# Patient Record
Sex: MALE | Race: WHITE | NOT HISPANIC OR LATINO | Employment: OTHER | URBAN - METROPOLITAN AREA
[De-identification: names, ages, dates, MRNs, and addresses within clinical notes are randomized per-mention and may not be internally consistent; named-entity substitution may affect disease eponyms.]

---

## 2023-03-13 PROBLEM — M17.9 OSTEOARTHRITIS OF KNEE: Status: ACTIVE | Noted: 2023-03-13

## 2023-03-13 PROBLEM — M25.512 LEFT SHOULDER PAIN: Status: ACTIVE | Noted: 2023-03-13

## 2023-03-13 PROBLEM — M25.561 RIGHT KNEE PAIN: Status: ACTIVE | Noted: 2023-03-13

## 2023-03-13 PROBLEM — M10.9 GOUT: Status: ACTIVE | Noted: 2023-03-13

## 2023-03-13 PROBLEM — M54.12 CERVICAL RADICULOPATHY: Status: ACTIVE | Noted: 2023-03-13

## 2023-03-13 PROBLEM — R94.31 ABNORMAL EKG: Status: ACTIVE | Noted: 2023-03-13

## 2023-03-13 PROBLEM — E66.9 CLASS 2 OBESITY WITH BODY MASS INDEX (BMI) OF 36.0 TO 36.9 IN ADULT: Status: ACTIVE | Noted: 2023-03-13

## 2023-03-13 PROBLEM — E78.5 DYSLIPIDEMIA: Status: ACTIVE | Noted: 2023-03-13

## 2023-03-13 PROBLEM — I10 ESSENTIAL HYPERTENSION: Status: ACTIVE | Noted: 2023-03-13

## 2023-03-13 PROBLEM — G47.33 OSA ON CPAP: Status: ACTIVE | Noted: 2023-03-13

## 2023-03-13 PROBLEM — E78.1 HYPERTRIGLYCERIDEMIA: Status: ACTIVE | Noted: 2023-03-13

## 2023-03-13 PROBLEM — E66.812 CLASS 2 OBESITY WITH BODY MASS INDEX (BMI) OF 36.0 TO 36.9 IN ADULT: Status: ACTIVE | Noted: 2023-03-13

## 2023-03-13 PROBLEM — K63.5 COLON POLYPS: Status: ACTIVE | Noted: 2023-03-13

## 2023-03-13 PROBLEM — E11.9 DIABETES MELLITUS, TYPE 2 (MULTI): Status: ACTIVE | Noted: 2023-03-13

## 2023-03-13 RX ORDER — ALLOPURINOL 300 MG/1
1 TABLET ORAL DAILY
COMMUNITY
Start: 2017-06-29 | End: 2023-07-14

## 2023-03-13 RX ORDER — ROSUVASTATIN CALCIUM 20 MG/1
1 TABLET, COATED ORAL NIGHTLY
COMMUNITY
Start: 2019-03-07 | End: 2023-08-24

## 2023-03-13 RX ORDER — LANCETS
EACH MISCELLANEOUS
COMMUNITY

## 2023-03-13 RX ORDER — GLIPIZIDE 2.5 MG/1
1 TABLET, EXTENDED RELEASE ORAL DAILY
COMMUNITY
Start: 2022-03-17 | End: 2023-08-24

## 2023-03-13 RX ORDER — AMLODIPINE BESYLATE 5 MG/1
1 TABLET ORAL DAILY
COMMUNITY
Start: 2022-03-17 | End: 2023-11-20

## 2023-03-13 RX ORDER — ATENOLOL AND CHLORTHALIDONE TABLET 50; 25 MG/1; MG/1
1 TABLET ORAL DAILY
COMMUNITY
Start: 2017-06-29 | End: 2023-08-24

## 2023-03-13 RX ORDER — IBUPROFEN 200 MG
CAPSULE ORAL
COMMUNITY
Start: 2021-06-24

## 2023-03-17 ENCOUNTER — OFFICE VISIT (OUTPATIENT)
Dept: PRIMARY CARE | Facility: CLINIC | Age: 73
End: 2023-03-17
Payer: MEDICARE

## 2023-03-17 DIAGNOSIS — E11.9 TYPE 2 DIABETES MELLITUS WITHOUT COMPLICATION, WITHOUT LONG-TERM CURRENT USE OF INSULIN (MULTI): ICD-10-CM

## 2023-03-17 DIAGNOSIS — L72.3 INFECTED SEBACEOUS CYST: Primary | ICD-10-CM

## 2023-03-17 DIAGNOSIS — R21 RASH: ICD-10-CM

## 2023-03-17 DIAGNOSIS — E66.01 CLASS 2 SEVERE OBESITY WITH SERIOUS COMORBIDITY AND BODY MASS INDEX (BMI) OF 37.0 TO 37.9 IN ADULT, UNSPECIFIED OBESITY TYPE (MULTI): ICD-10-CM

## 2023-03-17 DIAGNOSIS — L08.9 INFECTED SEBACEOUS CYST: Primary | ICD-10-CM

## 2023-03-17 PROCEDURE — 99213 OFFICE O/P EST LOW 20 MIN: CPT | Performed by: FAMILY MEDICINE

## 2023-03-17 PROCEDURE — 3008F BODY MASS INDEX DOCD: CPT | Performed by: FAMILY MEDICINE

## 2023-03-17 PROCEDURE — 1160F RVW MEDS BY RX/DR IN RCRD: CPT | Performed by: FAMILY MEDICINE

## 2023-03-17 PROCEDURE — 1036F TOBACCO NON-USER: CPT | Performed by: FAMILY MEDICINE

## 2023-03-17 PROCEDURE — 3078F DIAST BP <80 MM HG: CPT | Performed by: FAMILY MEDICINE

## 2023-03-17 PROCEDURE — 3075F SYST BP GE 130 - 139MM HG: CPT | Performed by: FAMILY MEDICINE

## 2023-03-17 PROCEDURE — 1159F MED LIST DOCD IN RCRD: CPT | Performed by: FAMILY MEDICINE

## 2023-03-17 ASSESSMENT — PATIENT HEALTH QUESTIONNAIRE - PHQ9
1. LITTLE INTEREST OR PLEASURE IN DOING THINGS: NOT AT ALL
SUM OF ALL RESPONSES TO PHQ9 QUESTIONS 1 AND 2: 0
2. FEELING DOWN, DEPRESSED OR HOPELESS: NOT AT ALL

## 2023-03-22 ENCOUNTER — TELEPHONE (OUTPATIENT)
Dept: PRIMARY CARE | Facility: CLINIC | Age: 73
End: 2023-03-22
Payer: MEDICARE

## 2023-03-27 NOTE — TELEPHONE ENCOUNTER
Pt called and said his boil is still there and he is wondering if you can remove the core of it and if you cant where should he go to get that removed?

## 2023-03-29 VITALS
HEART RATE: 64 BPM | BODY MASS INDEX: 37.02 KG/M2 | HEIGHT: 70 IN | DIASTOLIC BLOOD PRESSURE: 78 MMHG | SYSTOLIC BLOOD PRESSURE: 130 MMHG | WEIGHT: 258.6 LBS | TEMPERATURE: 97.7 F

## 2023-03-29 PROBLEM — L08.9 INFECTED SEBACEOUS CYST: Status: ACTIVE | Noted: 2023-03-29

## 2023-03-29 PROBLEM — M25.561 RIGHT KNEE PAIN: Status: RESOLVED | Noted: 2023-03-13 | Resolved: 2023-03-29

## 2023-03-29 PROBLEM — E78.1 HYPERTRIGLYCERIDEMIA: Status: RESOLVED | Noted: 2023-03-13 | Resolved: 2023-03-29

## 2023-03-29 PROBLEM — E66.01 CLASS 2 SEVERE OBESITY WITH SERIOUS COMORBIDITY AND BODY MASS INDEX (BMI) OF 37.0 TO 37.9 IN ADULT (MULTI): Status: ACTIVE | Noted: 2023-03-13

## 2023-03-29 PROBLEM — R21 RASH: Status: ACTIVE | Noted: 2023-03-29

## 2023-03-29 PROBLEM — L72.3 INFECTED SEBACEOUS CYST: Status: ACTIVE | Noted: 2023-03-29

## 2023-03-29 RX ORDER — DOXYCYCLINE 100 MG/1
100 CAPSULE ORAL 2 TIMES DAILY
Qty: 20 CAPSULE | Refills: 0 | Status: SHIPPED | OUTPATIENT
Start: 2023-03-29 | End: 2023-04-08

## 2023-03-29 ASSESSMENT — ENCOUNTER SYMPTOMS
NAIL CHANGES: 0
VOMITING: 0
RHINORRHEA: 0
DIARRHEA: 0
SHORTNESS OF BREATH: 0
FATIGUE: 0
ANOREXIA: 0
COUGH: 0
FEVER: 0
SORE THROAT: 0
EYE PAIN: 0

## 2023-03-29 NOTE — ASSESSMENT & PLAN NOTE
You have 2 sebaceous cysts on your back and they were infected apply the antibiotic cream take the oral antibiotic  Consult surgery to have these removed that way the cyst should not return  Please call if you need anything

## 2023-03-29 NOTE — PATIENT INSTRUCTIONS
The rash is actually to infected sebaceous cysts  The antibiotic and warm compresses should take care of it I was able to drain them today  You will need to see a surgeon to have these removed  I recommend Dr. Juan Carlos Hernandez at [facility]  Above normal BMI you need to work on diet and exercise this will help with diabetes control as well as other health issues  Blood pressure was initially high but on recheck was better  Please schedule annual Medicare wellness exam

## 2023-03-29 NOTE — PROGRESS NOTES
Subjective   Patient ID: Ashok Fang is a 72 y.o. male who presents for Rash.  Rash  This is a recurrent problem. The current episode started more than 1 month ago. The problem has been rapidly worsening since onset. The affected locations include the back. The rash is characterized by draining, pain, redness and swelling. He was exposed to nothing. Pertinent negatives include no anorexia, congestion, cough, diarrhea, eye pain, facial edema, fatigue, fever, joint pain, nail changes, rhinorrhea, shortness of breath, sore throat or vomiting. Past treatments include nothing. The treatment provided no relief. There is no history of allergies, asthma, eczema or varicella.       Review of Systems   Constitutional:  Negative for fatigue and fever.   HENT:  Negative for congestion, rhinorrhea and sore throat.    Eyes:  Negative for pain.   Respiratory:  Negative for cough and shortness of breath.    Gastrointestinal:  Negative for anorexia, diarrhea and vomiting.   Musculoskeletal:  Negative for joint pain.   Skin:  Positive for rash. Negative for nail changes.     Constitutional: no chills, no fever and no night sweats.   Eyes: no blurred vision and no eyesight problems.   ENT: no hearing loss, no nasal congestion, no nasal discharge, no hoarseness and no sore throat.   Cardiovascular: no chest pain, no intermittent leg claudication, no lower extremity edema, no palpitations and no syncope.   Respiratory: no cough, no shortness of breath during exertion, no shortness of breath at rest and no wheezing.   Gastrointestinal: no abdominal pain, no blood in stools, no constipation, no diarrhea, no melena, no nausea, no rectal pain and no vomiting.   Genitourinary: no dysuria, no change in urinary frequency, no urinary hesitancy, no feelings of urinary urgency and no vaginal discharge.   Musculoskeletal: no arthralgias,  no back pain and no myalgias.   Integumentary: no new skin lesions and no rashes.   Neurological: no  "difficulty walking, no headache, no limb weakness, no numbness and no tingling.   Psychiatric: no anxiety, no depression, no anhedonia and no substance use disorders.   Endocrine: no recent weight gain and no recent weight loss.   Hematologic/Lymphatic: no tendency for easy bruising and no swollen glands .  Objective    /77   Pulse 64   Temp 36.5 °C (97.7 °F)   Ht 1.778 m (5' 10\")   Wt 117 kg (258 lb 9.6 oz)   BMI 37.11 kg/m²    Physical Exam  The patient appeared well nourished and normally developed. Vital signs as documented. Head exam is unremarkable. No scleral icterus or corneal arcus noted.  Pupils are equal round reactive to light extraocular movements are intact no hemorrhages noted on funduscopic exam mouth mucous membranes are moist no exudates ears canals clear TMs are gray pearly not injected nose no rhinorrhea or epistaxis Neck is without jugular venous distension, thyromegaly, or carotid bruits. Carotid upstrokes are brisk bilaterally. Lungs are clear to auscultation and percussion. Cardiac exam reveals the PMI to be normally sized and situated. Rhythm is regular. First and second heart sounds normal. No murmurs, rubs or gallops. Abdominal exam reveals normal bowel sounds, no masses, no organomegaly and no aortic enlargement. Extremities are nonedematous and both femoral and pedal pulses are normal.  Neurologic exam DTRs are equal bilaterally no focal deficits strength is symmetrical heme lymph no palpable lymph nodes in the neck axilla or groin 2 large sebaceous cyst on the back  Assessment/Plan   Problem List Items Addressed This Visit          Endocrine/Metabolic    Diabetes mellitus, type 2 (CMS/HCC)     Check blood work including hemoglobin A1c  Healthy diet exercise maintain a healthy weight         Class 2 severe obesity with serious comorbidity and body mass index (BMI) of 37.0 to 37.9 in adult (CMS/HCC)     Above normal BMI nutrition and physical activity reviewed maintaining a " healthy weight and controlling BMI will help manage chronic conditions            Infectious/Inflammatory    Infected sebaceous cyst - Primary     You have 2 sebaceous cysts on your back and they were infected apply the antibiotic cream take the oral antibiotic  Consult surgery to have these removed that way the cyst should not return  Please call if you need anything            Other    Rash     Appears to be infectious in nature  Take the antibiotic  Follow-up if symptoms do not improve         Relevant Medications    doxycycline (Vibramycin) 100 mg capsule            Lore Avendano MD

## 2023-03-29 NOTE — ASSESSMENT & PLAN NOTE
Above normal BMI nutrition and physical activity reviewed maintaining a healthy weight and controlling BMI will help manage chronic conditions

## 2023-07-14 ENCOUNTER — OFFICE VISIT (OUTPATIENT)
Dept: PRIMARY CARE | Facility: CLINIC | Age: 73
End: 2023-07-14
Payer: MEDICARE

## 2023-07-14 VITALS
WEIGHT: 257 LBS | HEART RATE: 60 BPM | TEMPERATURE: 97.1 F | BODY MASS INDEX: 36.79 KG/M2 | SYSTOLIC BLOOD PRESSURE: 130 MMHG | HEIGHT: 70 IN | DIASTOLIC BLOOD PRESSURE: 77 MMHG

## 2023-07-14 DIAGNOSIS — Z00.00 MEDICARE ANNUAL WELLNESS VISIT, SUBSEQUENT: Primary | ICD-10-CM

## 2023-07-14 DIAGNOSIS — Z12.5 PROSTATE CANCER SCREENING: ICD-10-CM

## 2023-07-14 DIAGNOSIS — I10 ESSENTIAL HYPERTENSION: ICD-10-CM

## 2023-07-14 DIAGNOSIS — Z11.59 NEED FOR HEPATITIS C SCREENING TEST: ICD-10-CM

## 2023-07-14 DIAGNOSIS — E66.01 CLASS 2 SEVERE OBESITY WITH SERIOUS COMORBIDITY AND BODY MASS INDEX (BMI) OF 36.0 TO 36.9 IN ADULT, UNSPECIFIED OBESITY TYPE (MULTI): ICD-10-CM

## 2023-07-14 DIAGNOSIS — E11.9 TYPE 2 DIABETES MELLITUS WITHOUT COMPLICATION, WITHOUT LONG-TERM CURRENT USE OF INSULIN (MULTI): ICD-10-CM

## 2023-07-14 DIAGNOSIS — E78.2 MIXED HYPERLIPIDEMIA: ICD-10-CM

## 2023-07-14 DIAGNOSIS — M54.12 CERVICAL RADICULOPATHY: ICD-10-CM

## 2023-07-14 LAB
POC ALBUMIN /CREATININE RATIO MANUALLY ENTERED: <30 UG/MG CREAT
POC URINE ALBUMIN: 10 MG/L
POC URINE CREATININE: 200 MG/DL

## 2023-07-14 PROCEDURE — 3075F SYST BP GE 130 - 139MM HG: CPT | Performed by: FAMILY MEDICINE

## 2023-07-14 PROCEDURE — 1159F MED LIST DOCD IN RCRD: CPT | Performed by: FAMILY MEDICINE

## 2023-07-14 PROCEDURE — 1170F FXNL STATUS ASSESSED: CPT | Performed by: FAMILY MEDICINE

## 2023-07-14 PROCEDURE — 1036F TOBACCO NON-USER: CPT | Performed by: FAMILY MEDICINE

## 2023-07-14 PROCEDURE — 1160F RVW MEDS BY RX/DR IN RCRD: CPT | Performed by: FAMILY MEDICINE

## 2023-07-14 PROCEDURE — 82044 UR ALBUMIN SEMIQUANTITATIVE: CPT | Performed by: FAMILY MEDICINE

## 2023-07-14 PROCEDURE — 3078F DIAST BP <80 MM HG: CPT | Performed by: FAMILY MEDICINE

## 2023-07-14 PROCEDURE — 3008F BODY MASS INDEX DOCD: CPT | Performed by: FAMILY MEDICINE

## 2023-07-14 PROCEDURE — 99213 OFFICE O/P EST LOW 20 MIN: CPT | Performed by: FAMILY MEDICINE

## 2023-07-14 PROCEDURE — G0439 PPPS, SUBSEQ VISIT: HCPCS | Performed by: FAMILY MEDICINE

## 2023-07-14 PROCEDURE — 1125F AMNT PAIN NOTED PAIN PRSNT: CPT | Performed by: FAMILY MEDICINE

## 2023-07-14 RX ORDER — METHYLPREDNISOLONE 4 MG/1
TABLET ORAL
Qty: 21 TABLET | Refills: 0 | Status: SHIPPED | OUTPATIENT
Start: 2023-07-14 | End: 2023-07-21

## 2023-07-14 ASSESSMENT — ACTIVITIES OF DAILY LIVING (ADL)
MANAGING_FINANCES: INDEPENDENT
TAKING_MEDICATION: INDEPENDENT
GROCERY_SHOPPING: INDEPENDENT
DRESSING: INDEPENDENT
BATHING: INDEPENDENT
DOING_HOUSEWORK: INDEPENDENT

## 2023-07-14 ASSESSMENT — PATIENT HEALTH QUESTIONNAIRE - PHQ9
1. LITTLE INTEREST OR PLEASURE IN DOING THINGS: NOT AT ALL
2. FEELING DOWN, DEPRESSED OR HOPELESS: NOT AT ALL
SUM OF ALL RESPONSES TO PHQ9 QUESTIONS 1 AND 2: 0

## 2023-07-14 NOTE — PROGRESS NOTES
Subjective   Reason for Visit: Ashok Fang is an 72 y.o. male here for a Medicare Wellness visit.     Past Medical, Surgical, and Family History reviewed and updated in chart.    Reviewed all medications by prescribing practitioner or clinical pharmacist (such as prescriptions, OTCs, herbal therapies and supplements) and documented in the medical record.    Very pleasant 72-year-old here today for annual Medicare wellness exam and new complaint of neck pain  No known injury  No paresthesias  Not getting any better    Neck Pain   This is a new problem. The current episode started 1 to 4 weeks ago. The problem occurs daily. The problem has been gradually worsening. The pain is associated with nothing. The pain is present in the midline. The quality of the pain is described as aching and shooting. The pain is mild. The symptoms are aggravated by position and twisting. The pain is Same all the time. Stiffness is present All day. Pertinent negatives include no chest pain, fever, headaches, leg pain, numbness, pain with swallowing, paresis, photophobia, syncope, tingling, trouble swallowing, visual change, weakness or weight loss. He has tried nothing for the symptoms. The treatment provided no relief.       Patient Self Assessment of Health Status  Patient Self Assessment: Excellent    Nutrition and Exercise  Current Diet: Well Balanced Diet  Adequate Fluid Intake: Yes  Caffeine: Yes  Exercise Frequency: Regularly    Functional Ability/Level of Safety  Cognitive Impairment Observed: No cognitive impairment observed    Home Safety Risk Factors: None         Patient Care Team:  Lore Avendano MD as PCP - General  L[facility] Avendano MD [facility]     Review of Systems   Constitutional:  Negative for fever and weight loss.   HENT:  Negative for trouble swallowing.    Eyes:  Negative for photophobia.   Cardiovascular:  Negative for chest pain and syncope.   Musculoskeletal:  Positive for neck  "pain.   Neurological:  Negative for tingling, weakness, numbness and headaches.     Constitutional: no chills, no fever and no night sweats.   Eyes: no blurred vision and no eyesight problems.   ENT: no hearing loss, no nasal congestion, no nasal discharge, no hoarseness and no sore throat.   Cardiovascular: no chest pain, no intermittent leg claudication, no lower extremity edema, no palpitations and no syncope.   Respiratory: no cough, no shortness of breath during exertion, no shortness of breath at rest and no wheezing.   Gastrointestinal: no abdominal pain, no blood in stools, no constipation, no diarrhea, no melena, no nausea, no rectal pain and no vomiting.   Genitourinary: no dysuria, no change in urinary frequency, no urinary hesitancy, no feelings of urinary urgency and no vaginal discharge.   Musculoskeletal: no arthralgias,  no back pain and no myalgias.   Integumentary: no new skin lesions and no rashes.   Neurological: no difficulty walking, no headache, no limb weakness, no numbness and no tingling.   Psychiatric: no anxiety, no depression, no anhedonia and no substance use disorders.   Endocrine: no recent weight gain and no recent weight loss.   Hematologic/Lymphatic: no tendency for easy bruising and no swollen glands .    Objective     Vitals:  /77   Pulse 60   Temp 36.2 °C (97.1 °F)   Ht 1.778 m (5' 10\")   Wt 117 kg (257 lb)   BMI 36.88 kg/m²       Physical Exam  The patient appeared well nourished and normally developed. Vital signs as documented. Head exam is unremarkable. No scleral icterus or corneal arcus noted.  Pupils are equal round reactive to light extraocular movements are intact no hemorrhages noted on funduscopic exam mouth mucous membranes are moist no exudates ears canals clear TMs are gray pearly not injected nose no rhinorrhea or epistaxis Neck is without jugular venous distension, thyromegaly, or carotid bruits. Carotid upstrokes are brisk bilaterally. Lungs are clear " to auscultation and percussion. Cardiac exam reveals the PMI to be normally sized and situated. Rhythm is regular. First and second heart sounds normal. No murmurs, rubs or gallops. Abdominal exam reveals normal bowel sounds, no masses, no organomegaly and no aortic enlargement. Extremities are nonedematous and both femoral and pedal pulses are normal.  Neurologic exam DTRs are equal bilaterally no focal deficits strength is symmetrical heme lymph no palpable lymph nodes in the neck axilla or groin    Assessment/Plan   Problem List Items Addressed This Visit       Cervical radiculopathy    Current Assessment & Plan     You are pinching a nerve in your neck  Cautiously use the steroid pack  Monitor blood sugar carefully because steroids can slightly increase the blood sugar and this can be problematic for diabetes  Physical therapy if no improvement  Follow-up if no improvement         Diabetes mellitus, type 2 (CMS/[facility])    Current Assessment & Plan     Stable based on symptoms and exam  Check hemoglobin A1c and urine albumin         Relevant Orders    POCT Albumin random urine manually resulted (Completed)    Essential hypertension    Overview     [id number] Moises Ghosh  Check EKG.         Relevant Orders    Comprehensive Metabolic Panel    Class 2 severe obesity with serious comorbidity and body mass index (BMI) of 36.0 to 36.9 in adult (CMS/[facility])    Current Assessment & Plan     Above normal BMI  Nutrition and physical activity reviewed         Medicare annual wellness visit, subsequent - Primary    Current Assessment & Plan     Continue annual exams         Relevant Orders    Hemoglobin A1C    Mixed hyperlipidemia    Relevant Orders    Lipid Panel     Other Visit Diagnoses       Prostate cancer screening        Relevant Orders    Prostate Spec.Ag,Screen    Need for hepatitis C screening test        Relevant Orders    Hepatitis C antibody

## 2023-07-20 LAB
NON-UH HIE A/G RATIO: 1.6
NON-UH HIE ALB: 4.3 G/DL (ref 3.4–5)
NON-UH HIE ALK PHOS: 97 UNIT/L (ref 46–116)
NON-UH HIE BILIRUBIN, TOTAL: 0.5 MG/DL (ref 0.2–1)
NON-UH HIE BUN/CREAT RATIO: 25.6
NON-UH HIE BUN: 23 MG/DL (ref 9–23)
NON-UH HIE CALCIUM: 9.6 MG/DL (ref 8.7–10.4)
NON-UH HIE CALCULATED LDL CHOLESTEROL: 62 MG/DL (ref 60–130)
NON-UH HIE CALCULATED OSMOLALITY: 284 MOSM/KG (ref 275–295)
NON-UH HIE CHLORIDE: 105 MMOL/L (ref 98–107)
NON-UH HIE CHOLESTEROL: 122 MG/DL (ref 100–200)
NON-UH HIE CO2, VENOUS: 24 MMOL/L (ref 20–31)
NON-UH HIE CREATININE: 0.9 MG/DL (ref 0.6–1.1)
NON-UH HIE GFR AA: >60
NON-UH HIE GLOBULIN: 2.7 G/DL
NON-UH HIE GLOMERULAR FILTRATION RATE: >60 ML/MIN/1.73M?
NON-UH HIE GLUCOSE: 172 MG/DL (ref 74–106)
NON-UH HIE GOT: 18 UNIT/L (ref 15–37)
NON-UH HIE GPT: 28 UNIT/L (ref 10–49)
NON-UH HIE HDL CHOLESTEROL: 46 MG/DL (ref 40–60)
NON-UH HIE HEPATITIS C ANTIBODY: NONREACTIVE
NON-UH HIE HGB A1C: 6.7 %
NON-UH HIE K: 3.8 MMOL/L (ref 3.5–5.1)
NON-UH HIE NA: 138 MMOL/L (ref 135–145)
NON-UH HIE PROSTATIC SPECIFIC AG SCREEN: 0.5 NG/ML (ref 0–4)
NON-UH HIE TOTAL CHOL/HDL CHOL RATIO: 2.7
NON-UH HIE TOTAL PROTEIN: 7 G/DL (ref 5.7–8.2)
NON-UH HIE TRIGLYCERIDES: 72 MG/DL (ref 30–150)

## 2023-07-30 PROBLEM — E78.2 MIXED HYPERLIPIDEMIA: Status: ACTIVE | Noted: 2023-07-30

## 2023-07-30 PROBLEM — Z00.00 MEDICARE ANNUAL WELLNESS VISIT, SUBSEQUENT: Status: ACTIVE | Noted: 2023-07-30

## 2023-07-30 ASSESSMENT — ENCOUNTER SYMPTOMS
WEIGHT LOSS: 0
NUMBNESS: 0
VISUAL CHANGE: 0
FEVER: 0
LEG PAIN: 0
HEADACHES: 0
SYNCOPE: 0
PHOTOPHOBIA: 0
PARESIS: 0
NECK PAIN: 1
WEAKNESS: 0
TINGLING: 0
TROUBLE SWALLOWING: 0

## 2023-07-30 NOTE — ASSESSMENT & PLAN NOTE
You are pinching a nerve in your neck  Cautiously use the steroid pack  Monitor blood sugar carefully because steroids can slightly increase the blood sugar and this can be problematic for diabetes  Physical therapy if no improvement  Follow-up if no improvement

## 2023-07-30 NOTE — PATIENT INSTRUCTIONS
Today had your annual Medicare wellness exam and you were evaluated for pain in your neck and follow-up diabetes  The pain in your neck is most likely arthritic I am prescribing anti-inflammatory which can affect the blood sugar please watch closely and let me know if it goes above 200  Physical therapy for the neck  Follow-up in 6 to 8 weeks if no improvement otherwise follow-up in 6 months to check for diabetes and continue annual exams remember healthy diet and exercise to maintain a healthy weight

## 2023-08-18 DIAGNOSIS — E78.00 ELEVATED CHOLESTEROL: ICD-10-CM

## 2023-08-22 DIAGNOSIS — I10 ESSENTIAL HYPERTENSION: Primary | ICD-10-CM

## 2023-08-23 DIAGNOSIS — E11.9 TYPE 2 DIABETES MELLITUS (MULTI): Primary | ICD-10-CM

## 2023-08-24 RX ORDER — ROSUVASTATIN CALCIUM 20 MG/1
20 TABLET, COATED ORAL NIGHTLY
Qty: 90 TABLET | Refills: 3 | Status: SHIPPED | OUTPATIENT
Start: 2023-08-24

## 2023-08-24 RX ORDER — ATENOLOL AND CHLORTHALIDONE TABLET 50; 25 MG/1; MG/1
1 TABLET ORAL DAILY
Qty: 90 TABLET | Refills: 3 | Status: SHIPPED | OUTPATIENT
Start: 2023-08-24

## 2023-08-24 RX ORDER — GLIPIZIDE 2.5 MG/1
2.5 TABLET, EXTENDED RELEASE ORAL DAILY
Qty: 90 TABLET | Refills: 3 | Status: SHIPPED | OUTPATIENT
Start: 2023-08-24

## 2023-09-08 LAB — HEMOGLOBIN A1C/HEMOGLOBIN TOTAL IN BLOOD EXTERNAL: 6.7 %

## 2023-10-27 ENCOUNTER — TELEPHONE (OUTPATIENT)
Dept: PRIMARY CARE | Facility: CLINIC | Age: 73
End: 2023-10-27

## 2023-10-27 ENCOUNTER — TELEMEDICINE (OUTPATIENT)
Dept: PRIMARY CARE | Facility: CLINIC | Age: 73
End: 2023-10-27
Payer: MEDICARE

## 2023-10-27 DIAGNOSIS — R05.9 COUGH, UNSPECIFIED TYPE: ICD-10-CM

## 2023-10-27 PROCEDURE — 99213 OFFICE O/P EST LOW 20 MIN: CPT | Performed by: FAMILY MEDICINE

## 2023-10-27 RX ORDER — AZITHROMYCIN 250 MG/1
TABLET, FILM COATED ORAL
Qty: 6 TABLET | Refills: 0 | Status: SHIPPED | OUTPATIENT
Start: 2023-10-27 | End: 2023-11-01

## 2023-10-27 RX ORDER — BENZONATATE 200 MG/1
200 CAPSULE ORAL 3 TIMES DAILY PRN
Qty: 42 CAPSULE | Refills: 0 | Status: SHIPPED | OUTPATIENT
Start: 2023-10-27 | End: 2024-04-24

## 2023-10-27 ASSESSMENT — ENCOUNTER SYMPTOMS
WHEEZING: 0
CHILLS: 0
HEMOPTYSIS: 0
SORE THROAT: 1
SHORTNESS OF BREATH: 0
HEADACHES: 1
MYALGIAS: 1
FEVER: 0
SWEATS: 1
WEIGHT LOSS: 0
COUGH: 1
RHINORRHEA: 1

## 2023-10-27 ASSESSMENT — PATIENT HEALTH QUESTIONNAIRE - PHQ9
2. FEELING DOWN, DEPRESSED OR HOPELESS: NOT AT ALL
SUM OF ALL RESPONSES TO PHQ9 QUESTIONS 1 AND 2: 0
1. LITTLE INTEREST OR PLEASURE IN DOING THINGS: NOT AT ALL

## 2023-10-27 NOTE — PROGRESS NOTES
Subjective   Patient ID: Ashok Fang is a 73 y.o. male who presents for Nasal Congestion (- Covid 19/wants BENZONATATE  [facility] in [place] /Cough, stuffy nose, nasal pressure. Hot flashes. ).  Cough  This is a new problem. The current episode started in the past 7 days. The problem has been gradually worsening. The problem occurs every few minutes. The cough is Non-productive. Associated symptoms include headaches, myalgias, nasal congestion, rhinorrhea, a sore throat and sweats. Pertinent negatives include no chest pain, chills, ear congestion, ear pain, fever, hemoptysis, postnasal drip, rash, shortness of breath, weight loss or wheezing. Nothing aggravates the symptoms.       Review of Systems   Constitutional:  Negative for chills, fever and weight loss.   HENT:  Positive for rhinorrhea and sore throat. Negative for ear pain and postnasal drip.    Respiratory:  Positive for cough. Negative for hemoptysis, shortness of breath and wheezing.    Cardiovascular:  Negative for chest pain.   Musculoskeletal:  Positive for myalgias.   Skin:  Negative for rash.   Neurological:  Positive for headaches.   Constitutional: no chills, no fever and no night sweats.   Eyes: no blurred vision and no eyesight problems.   ENT: no hearing loss, no nasal congestion, no nasal discharge, no hoarseness and no sore throat.   Cardiovascular: no chest pain, no intermittent leg claudication, no lower extremity edema, no palpitations and no syncope.   Respiratory: no cough, no shortness of breath during exertion, no shortness of breath at rest and no wheezing.   Gastrointestinal: no abdominal pain, no blood in stools, no constipation, no diarrhea, no melena, no nausea, no rectal pain and no vomiting.   Genitourinary: no dysuria, no change in urinary frequency, no urinary hesitancy, no feelings of urinary urgency and no vaginal discharge.   Musculoskeletal: no arthralgias,  no back pain and no myalgias.   Integumentary: no new skin  lesions and no rashes.   Neurological: no difficulty walking, no headache, no limb weakness, no numbness and no tingling.   Psychiatric: no anxiety, no depression, no anhedonia and no substance use disorders.   Endocrine: no recent weight gain and no recent weight loss.   Hematologic/Lymphatic: no tendency for easy bruising and no swollen glands .      Objective    There were no vitals taken for this visit.   Physical Exam  Patient appears well is in no acute distress  Harsh cough  No apparent accessory muscle use or retractions  Sounds congested  Nontoxic-appearing  Able to teach take deep breaths without coughing  Assessment/Plan   Problem List Items Addressed This Visit       Cough    Relevant Medications    benzonatate (Tessalon) 200 mg capsule   Cough could potentially be COVID-19  Z-Rodney  Tessalon Perles  Follow-up in office if no improvement         Lore Avendano MD  Answers submitted by the patient for this visit:  Cough Questionnaire (Submitted on 10/27/2023)  Chief Complaint: Cough  Chronicity: new  Onset: in the past 7 days  Progression since onset: gradually worsening  Frequency: every few minutes  Cough characteristics: non-productive  chest pain: No  chills: No  ear congestion: No  ear pain: No  fever: No  headaches: Yes  hemoptysis: No  myalgias: Yes  nasal congestion: Yes  postnasal drip: No  rash: No  rhinorrhea: Yes  shortness of breath: No  sore throat: Yes  sweats: Yes  weight loss: No  wheezing: No  Aggravated by: nothing

## 2023-11-15 PROBLEM — R05.9 COUGH: Status: ACTIVE | Noted: 2023-11-15

## 2023-11-15 ASSESSMENT — ENCOUNTER SYMPTOMS
SHORTNESS OF BREATH: 0
CHILLS: 0
SWEATS: 1
HEMOPTYSIS: 0
WHEEZING: 0
WEIGHT LOSS: 0
SORE THROAT: 1
RHINORRHEA: 1
COUGH: 1
HEADACHES: 1
FEVER: 0
MYALGIAS: 1

## 2023-11-20 DIAGNOSIS — I10 ESSENTIAL HYPERTENSION: ICD-10-CM

## 2023-11-20 RX ORDER — AMLODIPINE BESYLATE 5 MG/1
5 TABLET ORAL DAILY
Qty: 90 TABLET | Refills: 3 | Status: SHIPPED | OUTPATIENT
Start: 2023-11-20

## 2024-06-28 ASSESSMENT — PROMIS GLOBAL HEALTH SCALE
RATE_SOCIAL_SATISFACTION: VERY GOOD
RATE_MENTAL_HEALTH: VERY GOOD
RATE_GENERAL_HEALTH: GOOD
EMOTIONAL_PROBLEMS: NEVER
RATE_QUALITY_OF_LIFE: VERY GOOD
RATE_AVERAGE_PAIN: 2
CARRYOUT_SOCIAL_ACTIVITIES: EXCELLENT
CARRYOUT_PHYSICAL_ACTIVITIES: MOSTLY
RATE_PHYSICAL_HEALTH: GOOD

## 2024-07-03 ENCOUNTER — APPOINTMENT (OUTPATIENT)
Dept: PRIMARY CARE | Facility: CLINIC | Age: 74
End: 2024-07-03
Payer: MEDICARE

## 2024-07-03 VITALS
TEMPERATURE: 97.7 F | HEART RATE: 71 BPM | SYSTOLIC BLOOD PRESSURE: 130 MMHG | DIASTOLIC BLOOD PRESSURE: 68 MMHG | BODY MASS INDEX: 36.3 KG/M2 | WEIGHT: 253 LBS | OXYGEN SATURATION: 93 %

## 2024-07-03 DIAGNOSIS — Z13.6 SCREENING FOR AAA (ABDOMINAL AORTIC ANEURYSM): ICD-10-CM

## 2024-07-03 DIAGNOSIS — E78.2 MIXED HYPERLIPIDEMIA: ICD-10-CM

## 2024-07-03 DIAGNOSIS — E11.9 TYPE 2 DIABETES MELLITUS (MULTI): ICD-10-CM

## 2024-07-03 DIAGNOSIS — Z00.00 MEDICARE ANNUAL WELLNESS VISIT, SUBSEQUENT: Primary | ICD-10-CM

## 2024-07-03 DIAGNOSIS — M1A.09X0 IDIOPATHIC CHRONIC GOUT OF MULTIPLE SITES WITHOUT TOPHUS: ICD-10-CM

## 2024-07-03 DIAGNOSIS — E11.9 TYPE 2 DIABETES MELLITUS WITHOUT COMPLICATION, WITHOUT LONG-TERM CURRENT USE OF INSULIN (MULTI): ICD-10-CM

## 2024-07-03 DIAGNOSIS — I10 PRIMARY HYPERTENSION: ICD-10-CM

## 2024-07-03 DIAGNOSIS — E66.01 CLASS 2 SEVERE OBESITY WITH SERIOUS COMORBIDITY AND BODY MASS INDEX (BMI) OF 36.0 TO 36.9 IN ADULT, UNSPECIFIED OBESITY TYPE (MULTI): ICD-10-CM

## 2024-07-03 DIAGNOSIS — M25.561 CHRONIC PAIN OF RIGHT KNEE: ICD-10-CM

## 2024-07-03 DIAGNOSIS — G89.29 CHRONIC PAIN OF RIGHT KNEE: ICD-10-CM

## 2024-07-03 PROBLEM — E78.5 DYSLIPIDEMIA: Status: RESOLVED | Noted: 2023-03-13 | Resolved: 2024-07-03

## 2024-07-03 PROBLEM — M17.11 PRIMARY OSTEOARTHRITIS OF RIGHT KNEE: Status: ACTIVE | Noted: 2023-03-13

## 2024-07-03 PROCEDURE — 1036F TOBACCO NON-USER: CPT | Performed by: FAMILY MEDICINE

## 2024-07-03 PROCEDURE — 3008F BODY MASS INDEX DOCD: CPT | Performed by: FAMILY MEDICINE

## 2024-07-03 PROCEDURE — 3078F DIAST BP <80 MM HG: CPT | Performed by: FAMILY MEDICINE

## 2024-07-03 PROCEDURE — 20610 DRAIN/INJ JOINT/BURSA W/O US: CPT | Performed by: FAMILY MEDICINE

## 2024-07-03 PROCEDURE — 99212 OFFICE O/P EST SF 10 MIN: CPT | Performed by: FAMILY MEDICINE

## 2024-07-03 PROCEDURE — 1160F RVW MEDS BY RX/DR IN RCRD: CPT | Performed by: FAMILY MEDICINE

## 2024-07-03 PROCEDURE — 1159F MED LIST DOCD IN RCRD: CPT | Performed by: FAMILY MEDICINE

## 2024-07-03 PROCEDURE — G0439 PPPS, SUBSEQ VISIT: HCPCS | Performed by: FAMILY MEDICINE

## 2024-07-03 PROCEDURE — 1170F FXNL STATUS ASSESSED: CPT | Performed by: FAMILY MEDICINE

## 2024-07-03 PROCEDURE — 3075F SYST BP GE 130 - 139MM HG: CPT | Performed by: FAMILY MEDICINE

## 2024-07-03 RX ORDER — ATENOLOL AND CHLORTHALIDONE TABLET 50; 25 MG/1; MG/1
1 TABLET ORAL DAILY
Qty: 90 TABLET | Refills: 3 | Status: SHIPPED | OUTPATIENT
Start: 2024-07-03

## 2024-07-03 RX ORDER — ALLOPURINOL 300 MG/1
300 TABLET ORAL DAILY
Qty: 90 TABLET | Refills: 3 | Status: SHIPPED | OUTPATIENT
Start: 2024-07-03

## 2024-07-03 RX ORDER — GLIPIZIDE 2.5 MG/1
2.5 TABLET, EXTENDED RELEASE ORAL DAILY
Qty: 90 TABLET | Refills: 3 | Status: SHIPPED | OUTPATIENT
Start: 2024-07-03

## 2024-07-03 RX ORDER — AMLODIPINE BESYLATE 5 MG/1
5 TABLET ORAL DAILY
Qty: 90 TABLET | Refills: 3 | Status: SHIPPED | OUTPATIENT
Start: 2024-07-03

## 2024-07-03 RX ORDER — TRIAMCINOLONE ACETONIDE 40 MG/ML
40 INJECTION, SUSPENSION INTRA-ARTICULAR; INTRAMUSCULAR ONCE
Status: COMPLETED | OUTPATIENT
Start: 2024-07-03 | End: 2024-07-08

## 2024-07-03 RX ORDER — ROSUVASTATIN CALCIUM 20 MG/1
20 TABLET, COATED ORAL NIGHTLY
Qty: 90 TABLET | Refills: 3 | Status: SHIPPED | OUTPATIENT
Start: 2024-07-03

## 2024-07-03 RX ORDER — ALLOPURINOL 300 MG/1
300 TABLET ORAL DAILY
COMMUNITY
Start: 2018-04-04 | End: 2024-07-03 | Stop reason: SDUPTHER

## 2024-07-03 ASSESSMENT — ACTIVITIES OF DAILY LIVING (ADL)
MANAGING_FINANCES: INDEPENDENT
BATHING: INDEPENDENT
GROCERY_SHOPPING: INDEPENDENT
DRESSING: INDEPENDENT
DOING_HOUSEWORK: INDEPENDENT
TAKING_MEDICATION: INDEPENDENT

## 2024-07-03 ASSESSMENT — PATIENT HEALTH QUESTIONNAIRE - PHQ9
SUM OF ALL RESPONSES TO PHQ9 QUESTIONS 1 AND 2: 0
2. FEELING DOWN, DEPRESSED OR HOPELESS: NOT AT ALL
1. LITTLE INTEREST OR PLEASURE IN DOING THINGS: NOT AT ALL

## 2024-07-03 NOTE — PROGRESS NOTES
Subjective   Reason for Visit: Ashok Fang is an 73 y.o. male here for a Medicare Wellness visit.     Past Medical, Surgical, and Family History reviewed and updated in chart.    Reviewed all medications by prescribing practitioner or clinical pharmacist (such as prescriptions, OTCs, herbal therapies and supplements) and documented in the medical record.    Very pleasant 73-year-old here today for annual Medicare wellness exam and follow-up of chronic conditions and he would like an injection in his right knee he has longstanding history of osteoarthritis cortisone shots have helped for his last one was a year ago he is not a smoker he is trying to work on diet and exercise has diabetes controlled with glipizide no episodes of hypo or hyperglycemia no ketoacidosis and no hospital for ketoacidosis he has had this for many years also has hypertension which is well-controlled on atenolol chlorthalidone and amlodipine with no side effects to report and he has hyperlipidemia which is controlled with rosuvastatin he reports no side effects on any of these his compliance is good he does have a history of heart disease but has not had any episodes and no recent hospitalizations for PVD or CVD recently no numbness or tingling in his feet he does have gout would like to continue the allopurinol this helps excessively he has been it limits his exacerbations and he is doing well other than the cortisone shot for his knee he would he is doing well he is not a smoker but is a previous smoker no angina palpitations or syncope no hypo or hyperglycemia no ketoacidosis blood sugars on average are less than 140 so he has stopped measuring and otherwise feels well    Patient ID: Ashok Fang is a 73 y.o. male.    Joint Injection Large/Arthrocentesis: R knee on 7/8/2024 10:56 AM  Indications: pain  Details: 25 G needle, lateral approach  Medications: 40 mg triamcinolone acetonide (Kenalog-40) injection 40 mg/mL    1 cc Kenalog  injected laterally patient tolerated well  Consent was given by the patient.           Patient Self Assessment of Health Status  Patient Self Assessment: Good    Nutrition and Exercise  Current Diet: Well Balanced Diet  Adequate Fluid Intake: Yes  Caffeine: Yes  Exercise Frequency: Infrequently    Functional Ability/Level of Safety  Cognitive Impairment Observed: No cognitive impairment observed    Home Safety Risk Factors: None         Patient Care Team:  Lore Avendano MD as PCP - General  [facility]     Review of Systems  Constitutional: no chills, no fever and no night sweats.   Eyes: no blurred vision and no eyesight problems.   ENT: no hearing loss, no nasal congestion, no nasal discharge, no hoarseness and no sore throat.   Cardiovascular: no chest pain, no intermittent leg claudication, no lower extremity edema, no palpitations and no syncope.   Respiratory: no cough, no shortness of breath during exertion, no shortness of breath at rest and no wheezing.   Gastrointestinal: no abdominal pain, no blood in stools, no constipation, no diarrhea, no melena, no nausea, no rectal pain and no vomiting.   Genitourinary: no dysuria, no change in urinary frequency, no urinary hesitancy, no feelings of urinary urgency and no vaginal discharge.   Musculoskeletal: no arthralgias,  no back pain and no myalgias.   Integumentary: no new skin lesions and no rashes.   Neurological: no difficulty walking, no headache, no limb weakness, no numbness and no tingling.   Psychiatric: no anxiety, no depression, no anhedonia and no substance use disorders.   Endocrine: no recent weight gain and no recent weight loss.   Hematologic/Lymphatic: no tendency for easy bruising and no swollen glands .    Objective     Vitals:  /68   Pulse 71   Temp 36.5 °C (97.7 °F)   Wt 115 kg (253 lb)   SpO2 93%   BMI 36.30 kg/m²       Physical Exam  The patient appeared well nourished and  normally developed. Vital signs as documented. Head exam is unremarkable. No scleral icterus or corneal arcus noted.  Pupils are equal round reactive to light extraocular movements are intact no hemorrhages noted on funduscopic exam mouth mucous membranes are moist no exudates ears canals clear TMs are gray pearly not injected nose no rhinorrhea or epistaxis Neck is without jugular venous distension, thyromegaly, or carotid bruits. Carotid upstrokes are brisk bilaterally. Lungs are clear to auscultation and percussion. Cardiac exam reveals the PMI to be normally sized and situated. Rhythm is regular. First and second heart sounds normal. No murmurs, rubs or gallops. Abdominal exam reveals normal bowel sounds, no masses, no organomegaly and no aortic enlargement. Extremities are nonedematous and both femoral and pedal pulses are normal.  Neurologic exam DTRs are equal bilaterally no focal deficits strength is symmetrical heme lymph no palpable lymph nodes in the neck axilla or groin  Degenerative changes in the joint pain along lines joint line right knee crepitance with movement barefoot exam good pulses good light touch good capillary refill no ulcers  Assessment/Plan   Problem List Items Addressed This Visit       Type 2 diabetes mellitus without complication, without long-term current use of insulin (Multi)    Current Assessment & Plan     Relatively stable with glipizide with no hypoglycemic episodes  Recommend diabetic diet increasing physical activity optimizing BMI  Continue current medication and management and follow-up in 6 months         Relevant Orders    Comprehensive Metabolic Panel    Hemoglobin A1C    Primary hypertension    Overview     [id number] Moises Ghosh  Check EKG.         Current Assessment & Plan     Stable and controlled on amlodipine and atenolol chlorthalidone  Continue current medication and management and follow-up yearly  Continue Mediterranean diet and optimize BMI         Relevant  Medications    atenoloL-chlorthalidone (Tenoretic) 50-25 mg tablet    amLODIPine (Norvasc) 5 mg tablet    Gout    Overview     30Mar2021 Moises Ghosh  Continue allopurinol. Low-purine diet.         Current Assessment & Plan     Stable and controlled at this time  Continue allopurinol and low purine diet and follow-up yearly         Relevant Medications    allopurinol (Zyloprim) 300 mg tablet    Chronic pain of right knee    Current Assessment & Plan     Secondary to osteoarthritis  1 cc of Kenalog injected laterally today patient tolerated well         Relevant Medications    triamcinolone acetonide (Kenalog-40) injection 40 mg    Class 2 severe obesity with serious comorbidity and body mass index (BMI) of 36.0 to 36.9 in adult (Multi)    Current Assessment & Plan     Above normal BMI nutrition and physical activity reviewed  Recommend weight watchers or Noom  Follow-up in 6 months         Medicare annual wellness visit, subsequent - Primary    Current Assessment & Plan     Otherwise unremarkable physical exam in a 73-year-old  Continue healthy diet and exercise  Recommend tetanus  Continue annual exams         Mixed hyperlipidemia    Current Assessment & Plan     Stable based on symptoms and exam  Controlled with rosuvastatin  Continue Mediterranean diet and follow-up yearly         Relevant Medications    rosuvastatin (Crestor) 20 mg tablet    Other Relevant Orders    Lipid Panel     Other Visit Diagnoses       Screening for AAA (abdominal aortic aneurysm)        Relevant Orders    Vascular US Abdominal Aorta Aneurysm AAA Screening    Type 2 diabetes mellitus (Multi)        Relevant Medications    glipiZIDE XL (Glucotrol XL) 2.5 mg 24 hr tablet    Other Relevant Orders    Comprehensive Metabolic Panel    Hemoglobin A1C

## 2024-07-08 PROBLEM — R21 RASH: Status: RESOLVED | Noted: 2023-03-29 | Resolved: 2024-07-08

## 2024-07-08 PROBLEM — J06.9 VIRAL UPPER RESPIRATORY TRACT INFECTION: Status: RESOLVED | Noted: 2024-07-08 | Resolved: 2024-07-08

## 2024-07-08 PROBLEM — L82.1 OTHER SEBORRHEIC KERATOSIS: Status: RESOLVED | Noted: 2023-07-19 | Resolved: 2024-07-08

## 2024-07-08 PROBLEM — L72.0 EPIDERMAL CYST: Status: RESOLVED | Noted: 2023-07-19 | Resolved: 2024-07-08

## 2024-07-08 PROBLEM — L72.3 INFECTED SEBACEOUS CYST: Status: RESOLVED | Noted: 2023-03-29 | Resolved: 2024-07-08

## 2024-07-08 PROBLEM — G89.29 CHRONIC PAIN OF RIGHT KNEE: Status: ACTIVE | Noted: 2023-03-13

## 2024-07-08 PROBLEM — M25.512 LEFT SHOULDER PAIN: Status: RESOLVED | Noted: 2023-03-13 | Resolved: 2024-07-08

## 2024-07-08 PROBLEM — L57.0 ACTINIC KERATOSIS: Status: RESOLVED | Noted: 2023-07-19 | Resolved: 2024-07-08

## 2024-07-08 PROBLEM — L57.8 OTHER SKIN CHANGES DUE TO CHRONIC EXPOSURE TO NONIONIZING RADIATION: Status: RESOLVED | Noted: 2023-07-27 | Resolved: 2024-07-08

## 2024-07-08 PROBLEM — L08.9 INFECTED SEBACEOUS CYST: Status: RESOLVED | Noted: 2023-03-29 | Resolved: 2024-07-08

## 2024-07-08 PROBLEM — D23.39 OTHER BENIGN NEOPLASM OF SKIN OF OTHER PARTS OF FACE: Status: RESOLVED | Noted: 2023-07-19 | Resolved: 2024-07-08

## 2024-07-08 PROBLEM — R05.9 COUGH: Status: RESOLVED | Noted: 2023-11-15 | Resolved: 2024-07-08

## 2024-07-08 PROBLEM — D18.01 HEMANGIOMA OF SKIN AND SUBCUTANEOUS TISSUE: Status: RESOLVED | Noted: 2023-07-19 | Resolved: 2024-07-08

## 2024-07-08 ASSESSMENT — ACTIVITIES OF DAILY LIVING (ADL)
MANAGING_FINANCES: INDEPENDENT
BATHING: INDEPENDENT
TAKING_MEDICATION: INDEPENDENT
DRESSING: INDEPENDENT
GROCERY_SHOPPING: INDEPENDENT
DOING_HOUSEWORK: INDEPENDENT

## 2024-07-08 ASSESSMENT — PATIENT HEALTH QUESTIONNAIRE - PHQ9
SUM OF ALL RESPONSES TO PHQ9 QUESTIONS 1 AND 2: 0
1. LITTLE INTEREST OR PLEASURE IN DOING THINGS: NOT AT ALL
2. FEELING DOWN, DEPRESSED OR HOPELESS: NOT AT ALL

## 2024-07-08 NOTE — ASSESSMENT & PLAN NOTE
Otherwise unremarkable physical exam in a 73-year-old  Continue healthy diet and exercise  Recommend tetanus  Continue annual exams

## 2024-07-08 NOTE — ASSESSMENT & PLAN NOTE
Stable and controlled on amlodipine and atenolol chlorthalidone  Continue current medication and management and follow-up yearly  Continue Mediterranean diet and optimize BMI

## 2024-07-08 NOTE — ASSESSMENT & PLAN NOTE
Above normal BMI nutrition and physical activity reviewed  Recommend weight watchers or Noom  Follow-up in 6 months

## 2024-07-08 NOTE — ASSESSMENT & PLAN NOTE
Relatively stable with glipizide with no hypoglycemic episodes  Recommend diabetic diet increasing physical activity optimizing BMI  Continue current medication and management and follow-up in 6 months

## 2024-07-08 NOTE — ASSESSMENT & PLAN NOTE
Stable based on symptoms and exam  Controlled with rosuvastatin  Continue Mediterranean diet and follow-up yearly

## 2024-07-16 ENCOUNTER — TELEPHONE (OUTPATIENT)
Dept: PRIMARY CARE | Facility: CLINIC | Age: 74
End: 2024-07-16
Payer: MEDICARE

## 2024-07-16 NOTE — TELEPHONE ENCOUNTER
[facility] central scheduling called for Ashok's screening for AAA and asked if it had two codes.  It only has one.  She said medicare now requires two codes.  She needs a code that says he is a former smoker if he is or another reason why the screening is needed.  Please advise.    Please fax to [facility] when completed

## 2024-07-16 NOTE — TELEPHONE ENCOUNTER
Pt called and said he is going to [facility] in [place] on [address] for the test- Vascular US abdomenal aorta aneurysm AAA screening. Z 13.6. He was told that they need 2 diagnosis codes for that test.  Please call patient when this is done.  Ashok  [phone]

## 2024-07-17 DIAGNOSIS — Z13.6 SCREENING FOR AAA (ABDOMINAL AORTIC ANEURYSM): Primary | ICD-10-CM

## 2024-07-18 LAB
NON-UH HIE A/G RATIO: 1.3
NON-UH HIE ALB: 3.8 G/DL (ref 3.4–5)
NON-UH HIE ALK PHOS: 95 UNIT/L (ref 45–117)
NON-UH HIE BILIRUBIN, TOTAL: 0.4 MG/DL (ref 0.3–1.2)
NON-UH HIE BUN/CREAT RATIO: 27.8
NON-UH HIE BUN: 25 MG/DL (ref 9–23)
NON-UH HIE CALCIUM: 9.4 MG/DL (ref 8.7–10.4)
NON-UH HIE CALCULATED LDL CHOLESTEROL: 47 MG/DL (ref 60–130)
NON-UH HIE CALCULATED OSMOLALITY: 284 MOSM/KG (ref 275–295)
NON-UH HIE CHLORIDE: 105 MMOL/L (ref 98–107)
NON-UH HIE CHOLESTEROL: 139 MG/DL (ref 100–200)
NON-UH HIE CO2, VENOUS: 25 MMOL/L (ref 20–31)
NON-UH HIE CREATININE: 0.9 MG/DL (ref 0.6–1.1)
NON-UH HIE GFR AA: >60
NON-UH HIE GLOBULIN: 3 G/DL
NON-UH HIE GLOMERULAR FILTRATION RATE: >60 ML/MIN/1.73M?
NON-UH HIE GLUCOSE: 175 MG/DL (ref 74–106)
NON-UH HIE GOT: 28 UNIT/L (ref 15–37)
NON-UH HIE GPT: 36 UNIT/L (ref 10–49)
NON-UH HIE HDL CHOLESTEROL: 30 MG/DL (ref 40–60)
NON-UH HIE HGB A1C: 7.4 %
NON-UH HIE K: 3.8 MMOL/L (ref 3.5–5.1)
NON-UH HIE NA: 138 MMOL/L (ref 135–145)
NON-UH HIE TOTAL CHOL/HDL CHOL RATIO: 4.6
NON-UH HIE TOTAL PROTEIN: 6.8 G/DL (ref 5.7–8.2)
NON-UH HIE TRIGLYCERIDES: 312 MG/DL (ref 30–150)

## 2024-07-23 DIAGNOSIS — E78.2 MIXED HYPERLIPIDEMIA: ICD-10-CM

## 2024-07-23 RX ORDER — ROSUVASTATIN CALCIUM 20 MG/1
20 TABLET, COATED ORAL NIGHTLY
Qty: 90 TABLET | Refills: 3 | Status: SHIPPED | OUTPATIENT
Start: 2024-07-23

## 2024-07-26 DIAGNOSIS — Z13.6 SCREENING FOR AAA (ABDOMINAL AORTIC ANEURYSM): ICD-10-CM

## 2024-09-20 ENCOUNTER — TELEPHONE (OUTPATIENT)
Dept: PRIMARY CARE | Facility: CLINIC | Age: 74
End: 2024-09-20
Payer: MEDICARE

## 2024-09-20 DIAGNOSIS — M10.00 ACUTE IDIOPATHIC GOUT, UNSPECIFIED SITE: Primary | ICD-10-CM

## 2024-09-20 RX ORDER — COLCHICINE 0.6 MG/1
0.6 TABLET ORAL 2 TIMES DAILY
Qty: 60 TABLET | Refills: 0 | Status: SHIPPED | OUTPATIENT
Start: 2024-09-20 | End: 2024-10-20

## 2024-10-15 DIAGNOSIS — I10 PRIMARY HYPERTENSION: ICD-10-CM

## 2024-10-15 RX ORDER — AMLODIPINE BESYLATE 5 MG/1
5 TABLET ORAL DAILY
Qty: 90 TABLET | Refills: 3 | Status: SHIPPED | OUTPATIENT
Start: 2024-10-15

## 2025-04-28 ENCOUNTER — OFFICE VISIT (OUTPATIENT)
Dept: PRIMARY CARE | Facility: CLINIC | Age: 75
End: 2025-04-28
Payer: MEDICARE

## 2025-04-28 VITALS
WEIGHT: 247 LBS | HEIGHT: 70 IN | OXYGEN SATURATION: 94 % | TEMPERATURE: 97.5 F | DIASTOLIC BLOOD PRESSURE: 68 MMHG | HEART RATE: 69 BPM | BODY MASS INDEX: 35.36 KG/M2 | SYSTOLIC BLOOD PRESSURE: 130 MMHG

## 2025-04-28 DIAGNOSIS — J01.00 ACUTE NON-RECURRENT MAXILLARY SINUSITIS: Primary | ICD-10-CM

## 2025-04-28 PROCEDURE — 1159F MED LIST DOCD IN RCRD: CPT | Performed by: FAMILY MEDICINE

## 2025-04-28 PROCEDURE — 1160F RVW MEDS BY RX/DR IN RCRD: CPT | Performed by: FAMILY MEDICINE

## 2025-04-28 PROCEDURE — 3078F DIAST BP <80 MM HG: CPT | Performed by: FAMILY MEDICINE

## 2025-04-28 PROCEDURE — 3075F SYST BP GE 130 - 139MM HG: CPT | Performed by: FAMILY MEDICINE

## 2025-04-28 PROCEDURE — 3008F BODY MASS INDEX DOCD: CPT | Performed by: FAMILY MEDICINE

## 2025-04-28 PROCEDURE — 99213 OFFICE O/P EST LOW 20 MIN: CPT | Performed by: FAMILY MEDICINE

## 2025-04-28 PROCEDURE — 1036F TOBACCO NON-USER: CPT | Performed by: FAMILY MEDICINE

## 2025-04-28 RX ORDER — DOXYCYCLINE 100 MG/1
100 CAPSULE ORAL 2 TIMES DAILY
Qty: 28 CAPSULE | Refills: 0 | Status: SHIPPED | OUTPATIENT
Start: 2025-04-28 | End: 2025-05-12

## 2025-04-28 RX ORDER — BROMPHENIRAMINE MALEATE, PSEUDOEPHEDRINE HYDROCHLORIDE, AND DEXTROMETHORPHAN HYDROBROMIDE 2; 30; 10 MG/5ML; MG/5ML; MG/5ML
5 SYRUP ORAL 4 TIMES DAILY PRN
Qty: 120 ML | Refills: 0 | Status: SHIPPED | OUTPATIENT
Start: 2025-04-28 | End: 2025-05-08

## 2025-04-28 RX ORDER — METHYLPREDNISOLONE 4 MG/1
TABLET ORAL
Qty: 21 TABLET | Refills: 0 | Status: SHIPPED | OUTPATIENT
Start: 2025-04-28 | End: 2025-05-04

## 2025-04-28 ASSESSMENT — ENCOUNTER SYMPTOMS
LOSS OF SENSATION IN FEET: 0
OCCASIONAL FEELINGS OF UNSTEADINESS: 0
DEPRESSION: 0

## 2025-04-28 ASSESSMENT — COLUMBIA-SUICIDE SEVERITY RATING SCALE - C-SSRS
1. IN THE PAST MONTH, HAVE YOU WISHED YOU WERE DEAD OR WISHED YOU COULD GO TO SLEEP AND NOT WAKE UP?: NO
6. HAVE YOU EVER DONE ANYTHING, STARTED TO DO ANYTHING, OR PREPARED TO DO ANYTHING TO END YOUR LIFE?: NO
2. HAVE YOU ACTUALLY HAD ANY THOUGHTS OF KILLING YOURSELF?: NO

## 2025-04-28 NOTE — PROGRESS NOTES
"Subjective   Patient ID: Ashok Fang is a 74 y.o. male who presents for Sinusitis (Pt is here for sinus infection).  Upper respiratory symptoms  Over 2 weeks  Getting worse        Review of Systems  Constitutional: no chills, no fever and no night sweats.   Eyes: no blurred vision and no eyesight problems.   ENT: no hearing loss, no nasal congestion, no nasal discharge, no hoarseness and no sore throat.   Cardiovascular: no chest pain, no intermittent leg claudication, no lower extremity edema, no palpitations and no syncope.   Respiratory: no cough, no shortness of breath during exertion, no shortness of breath at rest and no wheezing.   Gastrointestinal: no abdominal pain, no blood in stools, no constipation, no diarrhea, no melena, no nausea, no rectal pain and no vomiting.   Genitourinary: no dysuria, no change in urinary frequency, no urinary hesitancy, no feelings of urinary urgency and no vaginal discharge.   Musculoskeletal: no arthralgias,  no back pain and no myalgias.   Integumentary: no new skin lesions and no rashes.   Neurological: no difficulty walking, no headache, no limb weakness, no numbness and no tingling.   Psychiatric: no anxiety, no depression, no anhedonia and no substance use disorders.   Endocrine: no recent weight gain and no recent weight loss.   Hematologic/Lymphatic: no tendency for easy bruising and no swollen glands .    Objective    /68   Pulse 69   Temp 36.4 °C (97.5 °F)   Ht 1.778 m (5' 10\")   Wt 112 kg (247 lb)   SpO2 94%   BMI 35.44 kg/m²    Physical Exam  The patient appeared well nourished and normally developed. Vital signs as documented. Head exam is unremarkable. No scleral icterus or corneal arcus noted.  Pupils are equal round reactive to light extraocular movements are intact no hemorrhages noted on funduscopic exam mouth mucous membranes are moist no exudates ears canals clear TMs are gray pearly not injected nose no rhinorrhea or epistaxis Neck is " without jugular venous distension, thyromegaly, or carotid bruits. Carotid upstrokes are brisk bilaterally. Lungs are clear to auscultation and percussion. Cardiac exam reveals the PMI to be normally sized and situated. Rhythm is regular. First and second heart sounds normal. No murmurs, rubs or gallops. Abdominal exam reveals normal bowel sounds, no masses, no organomegaly and no aortic enlargement. Extremities are nonedematous and both femoral and pedal pulses are normal.  Neurologic exam DTRs are equal bilaterally no focal deficits strength is symmetrical heme lymph no palpable lymph nodes in the neck axilla or groin    Assessment/Plan   Problem List Items Addressed This Visit       Acute non-recurrent maxillary sinusitis - Primary    Relevant Medications    doxycycline (Vibramycin) 100 mg capsule    methylPREDNISolone (Medrol Dospak) 4 mg tablets    brompheniramine-pseudoeph-DM 2-30-10 mg/5 mL syrup            Lore Avendano MD

## 2025-04-30 PROBLEM — J01.00 ACUTE NON-RECURRENT MAXILLARY SINUSITIS: Status: ACTIVE | Noted: 2025-04-30

## 2025-06-06 ENCOUNTER — TELEPHONE (OUTPATIENT)
Dept: PRIMARY CARE | Facility: CLINIC | Age: 75
End: 2025-06-06
Payer: MEDICARE

## 2025-06-06 NOTE — TELEPHONE ENCOUNTER
Please call Ashok  Since so much time is passed he should be seen again please  I am not trying to inconvenience him just need to see if something else is wrong

## 2025-06-06 NOTE — TELEPHONE ENCOUNTER
Pt was in on 4/28 with a sinus infection and was given antibiotics. He said it never fully went away and was wanting to know if he needs to be seen again or if there is a different medication he should take? Please advise.         [facility] [id number] [place], [place] - [address] .[facility] [address])  [address]  [facility] [address]  Phone: [phone] Fax: [phone]

## 2025-07-02 ENCOUNTER — APPOINTMENT (OUTPATIENT)
Dept: PRIMARY CARE | Facility: CLINIC | Age: 75
End: 2025-07-02
Payer: MEDICARE

## 2025-07-02 VITALS
BODY MASS INDEX: 35.65 KG/M2 | HEART RATE: 71 BPM | WEIGHT: 249 LBS | HEIGHT: 70 IN | SYSTOLIC BLOOD PRESSURE: 130 MMHG | DIASTOLIC BLOOD PRESSURE: 72 MMHG

## 2025-07-02 DIAGNOSIS — J01.00 ACUTE NON-RECURRENT MAXILLARY SINUSITIS: ICD-10-CM

## 2025-07-02 DIAGNOSIS — E66.812 CLASS 2 SEVERE OBESITY WITH SERIOUS COMORBIDITY AND BODY MASS INDEX (BMI) OF 35.0 TO 35.9 IN ADULT, UNSPECIFIED OBESITY TYPE: ICD-10-CM

## 2025-07-02 DIAGNOSIS — M1A.09X0 IDIOPATHIC CHRONIC GOUT OF MULTIPLE SITES WITHOUT TOPHUS: ICD-10-CM

## 2025-07-02 DIAGNOSIS — Z00.00 MEDICARE ANNUAL WELLNESS VISIT, SUBSEQUENT: Primary | ICD-10-CM

## 2025-07-02 DIAGNOSIS — M10.9 GOUTY ARTHRITIS: ICD-10-CM

## 2025-07-02 DIAGNOSIS — G89.29 CHRONIC PAIN OF RIGHT KNEE: ICD-10-CM

## 2025-07-02 DIAGNOSIS — E66.01 CLASS 2 SEVERE OBESITY WITH SERIOUS COMORBIDITY AND BODY MASS INDEX (BMI) OF 35.0 TO 35.9 IN ADULT, UNSPECIFIED OBESITY TYPE: ICD-10-CM

## 2025-07-02 DIAGNOSIS — E11.65 TYPE 2 DIABETES MELLITUS WITH HYPERGLYCEMIA, WITHOUT LONG-TERM CURRENT USE OF INSULIN: ICD-10-CM

## 2025-07-02 DIAGNOSIS — E11.9 TYPE 2 DIABETES MELLITUS: ICD-10-CM

## 2025-07-02 DIAGNOSIS — I10 PRIMARY HYPERTENSION: ICD-10-CM

## 2025-07-02 DIAGNOSIS — E78.2 MIXED HYPERLIPIDEMIA: ICD-10-CM

## 2025-07-02 DIAGNOSIS — M25.561 CHRONIC PAIN OF RIGHT KNEE: ICD-10-CM

## 2025-07-02 PROCEDURE — 20610 DRAIN/INJ JOINT/BURSA W/O US: CPT | Performed by: FAMILY MEDICINE

## 2025-07-02 PROCEDURE — 4010F ACE/ARB THERAPY RXD/TAKEN: CPT | Performed by: FAMILY MEDICINE

## 2025-07-02 PROCEDURE — G0439 PPPS, SUBSEQ VISIT: HCPCS | Performed by: FAMILY MEDICINE

## 2025-07-02 PROCEDURE — 99212 OFFICE O/P EST SF 10 MIN: CPT | Performed by: FAMILY MEDICINE

## 2025-07-02 PROCEDURE — 3008F BODY MASS INDEX DOCD: CPT | Performed by: FAMILY MEDICINE

## 2025-07-02 PROCEDURE — 3078F DIAST BP <80 MM HG: CPT | Performed by: FAMILY MEDICINE

## 2025-07-02 PROCEDURE — 1160F RVW MEDS BY RX/DR IN RCRD: CPT | Performed by: FAMILY MEDICINE

## 2025-07-02 PROCEDURE — 1159F MED LIST DOCD IN RCRD: CPT | Performed by: FAMILY MEDICINE

## 2025-07-02 PROCEDURE — 3075F SYST BP GE 130 - 139MM HG: CPT | Performed by: FAMILY MEDICINE

## 2025-07-02 PROCEDURE — 1170F FXNL STATUS ASSESSED: CPT | Performed by: FAMILY MEDICINE

## 2025-07-02 RX ORDER — LISINOPRIL 5 MG/1
5 TABLET ORAL DAILY
Qty: 90 TABLET | Refills: 3 | Status: SHIPPED | OUTPATIENT
Start: 2025-07-02 | End: 2026-07-02

## 2025-07-02 RX ORDER — ALLOPURINOL 300 MG/1
300 TABLET ORAL DAILY
Qty: 90 TABLET | Refills: 3 | Status: SHIPPED | OUTPATIENT
Start: 2025-07-02

## 2025-07-02 RX ORDER — TRIAMCINOLONE ACETONIDE 40 MG/ML
40 INJECTION, SUSPENSION INTRA-ARTICULAR; INTRAMUSCULAR ONCE
Status: COMPLETED | OUTPATIENT
Start: 2025-07-02 | End: 2025-07-02

## 2025-07-02 RX ORDER — ATENOLOL AND CHLORTHALIDONE TABLET 50; 25 MG/1; MG/1
1 TABLET ORAL DAILY
Qty: 90 TABLET | Refills: 3 | Status: SHIPPED | OUTPATIENT
Start: 2025-07-02

## 2025-07-02 RX ORDER — ROSUVASTATIN CALCIUM 20 MG/1
20 TABLET, COATED ORAL NIGHTLY
Qty: 90 TABLET | Refills: 3 | Status: SHIPPED | OUTPATIENT
Start: 2025-07-02

## 2025-07-02 RX ORDER — DOXYCYCLINE 100 MG/1
100 CAPSULE ORAL 2 TIMES DAILY
Qty: 28 CAPSULE | Refills: 0 | Status: SHIPPED | OUTPATIENT
Start: 2025-07-02 | End: 2025-07-16

## 2025-07-02 RX ORDER — GLIPIZIDE 2.5 MG/1
2.5 TABLET, EXTENDED RELEASE ORAL DAILY
Qty: 90 TABLET | Refills: 3 | Status: SHIPPED | OUTPATIENT
Start: 2025-07-02

## 2025-07-02 RX ORDER — AMLODIPINE BESYLATE 5 MG/1
5 TABLET ORAL DAILY
Qty: 90 TABLET | Refills: 3 | Status: SHIPPED | OUTPATIENT
Start: 2025-07-02

## 2025-07-02 RX ADMIN — TRIAMCINOLONE ACETONIDE 40 MG: 40 INJECTION, SUSPENSION INTRA-ARTICULAR; INTRAMUSCULAR at 15:08

## 2025-07-02 ASSESSMENT — ENCOUNTER SYMPTOMS
LOSS OF SENSATION IN FEET: 0
OCCASIONAL FEELINGS OF UNSTEADINESS: 0
DEPRESSION: 0

## 2025-07-02 ASSESSMENT — ACTIVITIES OF DAILY LIVING (ADL)
DOING_HOUSEWORK: INDEPENDENT
GROCERY_SHOPPING: INDEPENDENT
DRESSING: INDEPENDENT
MANAGING_FINANCES: INDEPENDENT
TAKING_MEDICATION: INDEPENDENT
BATHING: INDEPENDENT

## 2025-07-02 NOTE — PROGRESS NOTES
Subjective   Reason for Visit: Ashok Fang is an 74 y.o. male here for a Medicare Wellness visit. Followup meds and knee injection    Past Medical, Surgical, and Family History reviewed and updated in chart.    Reviewed all medications by prescribing practitioner or clinical pharmacist (such as prescriptions, OTCs, herbal therapies and supplements) and documented in the medical record.    Annual Medicare wellness exam follow-up chronic conditions  Chronic knee pain would like knee j injection of Kenalog has history of arthritis  Sinus symptoms  Hypertension    Hypertension  This is a chronic problem. The current episode started more than 1 year ago. The problem has been gradually improving since onset. The problem is controlled.   Diabetes  He presents for his follow-up diabetic visit. He has type 2 diabetes mellitus.   Hyperlipidemia  This is a chronic problem. The current episode started more than 1 year ago. The problem is controlled. Recent lipid tests were reviewed and are normal.       Patient Self Assessment of Health Status  Patient Self Assessment: Excellent    Nutrition and Exercise  Current Diet: Well Balanced Diet  Adequate Fluid Intake: Yes  Caffeine: Yes  Exercise Frequency: Regularly    Functional Ability/Level of Safety  Cognitive Impairment Observed: No cognitive impairment observed  Cognitive Impairment Reported: No cognitive impairment reported by patient or family    Home Safety Risk Factors: None         Patient Care Team:  Lore Avendano MD as PCP - General  Lore Avendano MD as PCP - [facility] Attributed Provider     Review of Systems  Constitutional: no chills, no fever and no night sweats.   Eyes: no blurred vision and no eyesight problems.   ENT: no hearing loss, no nasal congestion, no nasal discharge, no hoarseness and no sore throat.   Cardiovascular: no chest pain, no intermittent leg claudication, no lower extremity edema, no palpitations and no syncope.   Respiratory: no cough,  "no shortness of breath during exertion, no shortness of breath at rest and no wheezing.   Gastrointestinal: no abdominal pain, no blood in stools, no constipation, no diarrhea, no melena, no nausea, no rectal pain and no vomiting.   Genitourinary: no dysuria, no change in urinary frequency, no urinary hesitancy, no feelings of urinary urgency and no vaginal discharge.   Musculoskeletal: no arthralgias,  no back pain and no myalgias.   Integumentary: no new skin lesions and no rashes.   Neurological: no difficulty walking, no headache, no limb weakness, no numbness and no tingling.   Psychiatric: no anxiety, no depression, no anhedonia and no substance use disorders.   Endocrine: no recent weight gain and no recent weight loss.   Hematologic/Lymphatic: no tendency for easy bruising and no swollen glands .        Objective     Vitals:  /72   Pulse 71   Ht 1.778 m (5' 10\")   Wt 113 kg (249 lb)   BMI 35.73 kg/m²       Physical Exam  The patient appeared well nourished and normally developed. Vital signs as documented. Head exam is unremarkable. No scleral icterus or corneal arcus noted.  Pupils are equal round reactive to light extraocular movements are intact no hemorrhages noted on funduscopic exam mouth mucous membranes are moist no exudates ears canals clear TMs are gray pearly not injected nose no rhinorrhea or epistaxis Neck is without jugular venous distension, thyromegaly, or carotid bruits. Carotid upstrokes are brisk bilaterally. Lungs are clear to auscultation and percussion. Cardiac exam reveals the PMI to be normally sized and situated. Rhythm is regular. First and second heart sounds normal. No murmurs, rubs or gallops. Abdominal exam reveals normal bowel sounds, no masses, no organomegaly and no aortic enlargement. Extremities are nonedematous and both femoral and pedal pulses are normal.  Neurologic exam DTRs are equal bilaterally no focal deficits strength is symmetrical heme lymph no palpable " lymph nodes in the neck axilla or groin degenerative changes in the joints extremities no edemaPatient ID: Ashok Fang is a 74 y.o. male.    Joint Injection Large/Arthrocentesis: R knee on 7/2/2025 3:08 PM  Indications: pain  Details: 25 G needle, lateral approach  Medications: 40 mg triamcinolone acetonide (Kenalog-40) injection 40 mg/mL    1 cc Kenalog injected laterally into the right knee patient tolerated well          Assessment/Plan   Problem List Items Addressed This Visit       Type 2 diabetes mellitus with hyperglycemia, without long-term current use of insulin    Relevant Medications    rosuvastatin (Crestor) 20 mg tablet    glipiZIDE XL (Glucotrol XL) 2.5 mg 24 hr tablet    lisinopril 5 mg tablet    Other Relevant Orders    Hemoglobin A1C (Completed)    Albumin-Creatinine Ratio, Urine Random (Completed)    Primary hypertension    Overview   17Mar2022 Moises Ghosh  Check EKG.         Relevant Medications    amLODIPine (Norvasc) 5 mg tablet    atenoloL-chlorthalidone (Tenoretic) 50-25 mg tablet    lisinopril 5 mg tablet    Other Relevant Orders    Comprehensive metabolic panel (Completed)    Gout    Overview   30Mar2021 Moises Ghosh  Continue allopurinol. Low-purine diet.         Chronic pain of right knee    Relevant Medications    triamcinolone acetonide (Kenalog-40) injection 40 mg    Other Relevant Orders    Joint Injection Large/Arthrocentesis: R knee    Class 2 severe obesity with serious comorbidity and body mass index (BMI) of 35.0 to 35.9 in adult    Medicare annual wellness visit, subsequent - Primary    Mixed hyperlipidemia    Relevant Medications    rosuvastatin (Crestor) 20 mg tablet    Other Relevant Orders    Lipid panel (Completed)    Acute non-recurrent maxillary sinusitis    Gouty arthritis    Relevant Medications    allopurinol (Zyloprim) 300 mg tablet    Type 2 diabetes mellitus    Relevant Medications    rosuvastatin (Crestor) 20 mg tablet    glipiZIDE XL (Glucotrol XL) 2.5  mg 24 hr tablet    lisinopril 5 mg tablet    Other Relevant Orders    Hemoglobin A1C (Completed)    Albumin-Creatinine Ratio, Urine Random (Completed)

## 2025-07-11 LAB
ALBUMIN SERPL-MCNC: 4.3 G/DL (ref 3.6–5.1)
ALBUMIN/CREAT UR: 10 MG/G CREAT
ALP SERPL-CCNC: 85 U/L (ref 35–144)
ALT SERPL-CCNC: 22 U/L (ref 9–46)
ANION GAP SERPL CALCULATED.4IONS-SCNC: 7 MMOL/L (CALC) (ref 7–17)
AST SERPL-CCNC: 22 U/L (ref 10–35)
BILIRUB SERPL-MCNC: 0.5 MG/DL (ref 0.2–1.2)
BUN SERPL-MCNC: 17 MG/DL (ref 7–25)
CALCIUM SERPL-MCNC: 9.1 MG/DL (ref 8.6–10.3)
CHLORIDE SERPL-SCNC: 104 MMOL/L (ref 98–110)
CHOLEST SERPL-MCNC: 117 MG/DL
CHOLEST/HDLC SERPL: 2.7 (CALC)
CO2 SERPL-SCNC: 26 MMOL/L (ref 20–32)
CREAT SERPL-MCNC: 0.64 MG/DL (ref 0.7–1.28)
CREAT UR-MCNC: 136 MG/DL (ref 20–320)
EGFRCR SERPLBLD CKD-EPI 2021: 101 ML/MIN/1.73M2
EST. AVERAGE GLUCOSE BLD GHB EST-MCNC: 220 MG/DL
EST. AVERAGE GLUCOSE BLD GHB EST-SCNC: 12.2 MMOL/L
GLUCOSE SERPL-MCNC: 200 MG/DL (ref 65–99)
HBA1C MFR BLD: 9.3 %
HDLC SERPL-MCNC: 44 MG/DL
LDLC SERPL CALC-MCNC: 52 MG/DL (CALC)
MICROALBUMIN UR-MCNC: 1.4 MG/DL
NONHDLC SERPL-MCNC: 73 MG/DL (CALC)
POTASSIUM SERPL-SCNC: 3.9 MMOL/L (ref 3.5–5.3)
PROT SERPL-MCNC: 6.6 G/DL (ref 6.1–8.1)
SODIUM SERPL-SCNC: 137 MMOL/L (ref 135–146)
TRIGL SERPL-MCNC: 127 MG/DL

## 2025-07-21 ASSESSMENT — ENCOUNTER SYMPTOMS: HYPERTENSION: 1

## 2025-07-25 ENCOUNTER — APPOINTMENT (OUTPATIENT)
Dept: PRIMARY CARE | Facility: CLINIC | Age: 75
End: 2025-07-25
Payer: MEDICARE

## 2025-07-25 VITALS
WEIGHT: 247 LBS | HEART RATE: 62 BPM | SYSTOLIC BLOOD PRESSURE: 130 MMHG | DIASTOLIC BLOOD PRESSURE: 73 MMHG | BODY MASS INDEX: 35.36 KG/M2 | HEIGHT: 70 IN | OXYGEN SATURATION: 96 % | TEMPERATURE: 97 F

## 2025-07-25 DIAGNOSIS — E11.65 TYPE 2 DIABETES MELLITUS WITH HYPERGLYCEMIA, WITHOUT LONG-TERM CURRENT USE OF INSULIN: Primary | ICD-10-CM

## 2025-07-25 PROCEDURE — 99213 OFFICE O/P EST LOW 20 MIN: CPT | Performed by: FAMILY MEDICINE

## 2025-07-25 PROCEDURE — 3078F DIAST BP <80 MM HG: CPT | Performed by: FAMILY MEDICINE

## 2025-07-25 PROCEDURE — 1159F MED LIST DOCD IN RCRD: CPT | Performed by: FAMILY MEDICINE

## 2025-07-25 PROCEDURE — 1160F RVW MEDS BY RX/DR IN RCRD: CPT | Performed by: FAMILY MEDICINE

## 2025-07-25 PROCEDURE — 4010F ACE/ARB THERAPY RXD/TAKEN: CPT | Performed by: FAMILY MEDICINE

## 2025-07-25 PROCEDURE — 3008F BODY MASS INDEX DOCD: CPT | Performed by: FAMILY MEDICINE

## 2025-07-25 PROCEDURE — 3075F SYST BP GE 130 - 139MM HG: CPT | Performed by: FAMILY MEDICINE

## 2025-07-25 RX ORDER — TIRZEPATIDE 2.5 MG/.5ML
2.5 INJECTION, SOLUTION SUBCUTANEOUS WEEKLY
Qty: 2 ML | Refills: 0 | COMMUNITY
Start: 2025-07-25

## 2025-07-25 RX ORDER — TIRZEPATIDE 5 MG/.5ML
5 INJECTION, SOLUTION SUBCUTANEOUS WEEKLY
Qty: 6 ML | Refills: 3 | Status: SHIPPED | OUTPATIENT
Start: 2025-07-25

## 2025-07-25 NOTE — PROGRESS NOTES
"Subjective   Patient ID: Ashok Fang is a 74 y.o. male who presents for Follow-up (Med and labs follow up/Fasting ).  HPI    Review of Systems  Constitutional: no chills, no fever and no night sweats.   Eyes: no blurred vision and no eyesight problems.   ENT: no hearing loss, no nasal congestion, no nasal discharge, no hoarseness and no sore throat.   Cardiovascular: no chest pain, no intermittent leg claudication, no lower extremity edema, no palpitations and no syncope.   Respiratory: no cough, no shortness of breath during exertion, no shortness of breath at rest and no wheezing.   Gastrointestinal: no abdominal pain, no blood in stools, no constipation, no diarrhea, no melena, no nausea, no rectal pain and no vomiting.   Genitourinary: no dysuria, no change in urinary frequency, no urinary hesitancy, no feelings of urinary urgency and no vaginal discharge.   Musculoskeletal: no arthralgias,  no back pain and no myalgias.   Integumentary: no new skin lesions and no rashes.   Neurological: no difficulty walking, no headache, no limb weakness, no numbness and no tingling.   Psychiatric: no anxiety, no depression, no anhedonia and no substance use disorders.   Endocrine: no recent weight gain and no recent weight loss.   Hematologic/Lymphatic: no tendency for easy bruising and no swollen glands .    Objective    /73   Pulse 62   Temp 36.1 °C (97 °F)   Ht 1.778 m (5' 10\")   Wt 112 kg (247 lb)   SpO2 96%   BMI 35.44 kg/m²    Physical Exam  The patient appeared well nourished and normally developed. Vital signs as documented. Head exam is unremarkable. No scleral icterus or corneal arcus noted.  Pupils are equal round reactive to light extraocular movements are intact no hemorrhages noted on funduscopic exam mouth mucous membranes are moist no exudates ears canals clear TMs are gray pearly not injected nose no rhinorrhea or epistaxis Neck is without jugular venous distension, thyromegaly, or carotid " bruits. Carotid upstrokes are brisk bilaterally. Lungs are clear to auscultation and percussion. Cardiac exam reveals the PMI to be normally sized and situated. Rhythm is regular. First and second heart sounds normal. No murmurs, rubs or gallops. Abdominal exam reveals normal bowel sounds, no masses, no organomegaly and no aortic enlargement. Extremities are nonedematous and both femoral and pedal pulses are normal.  Neurologic exam DTRs are equal bilaterally no focal deficits strength is symmetrical heme lymph no palpable lymph nodes in the neck axilla or groin    Assessment/Plan   Problem List Items Addressed This Visit       Type 2 diabetes mellitus with hyperglycemia, without long-term current use of insulin - Primary    Relevant Medications    tirzepatide (Mounjaro) 5 mg/0.5 mL pen injector    tirzepatide (Mounjaro) 2.5 mg/0.5 mL pen injector     Sample of mounjaro   Lot# [id number]  Exp: 2-2-27  2.5mg/0.5ML 1 shot/wk qty 4 BP       Lore Avendano MD

## 2025-08-25 ENCOUNTER — APPOINTMENT (OUTPATIENT)
Dept: PRIMARY CARE | Facility: CLINIC | Age: 75
End: 2025-08-25
Payer: MEDICARE

## 2025-08-31 PROBLEM — J32.0 CHRONIC MAXILLARY SINUSITIS: Status: ACTIVE | Noted: 2025-08-31
